# Patient Record
Sex: FEMALE | Race: WHITE | NOT HISPANIC OR LATINO | URBAN - METROPOLITAN AREA
[De-identification: names, ages, dates, MRNs, and addresses within clinical notes are randomized per-mention and may not be internally consistent; named-entity substitution may affect disease eponyms.]

---

## 2017-04-20 ENCOUNTER — OFFICE VISIT (OUTPATIENT)
Dept: INTERNAL MEDICINE | Age: 42
End: 2017-04-20

## 2017-04-20 VITALS
WEIGHT: 156 LBS | OXYGEN SATURATION: 100 % | TEMPERATURE: 98.2 F | DIASTOLIC BLOOD PRESSURE: 62 MMHG | HEIGHT: 64 IN | BODY MASS INDEX: 26.63 KG/M2 | SYSTOLIC BLOOD PRESSURE: 112 MMHG | HEART RATE: 64 BPM

## 2017-04-20 DIAGNOSIS — T63.461A WASP STING, ACCIDENTAL OR UNINTENTIONAL, INITIAL ENCOUNTER: Primary | ICD-10-CM

## 2017-04-20 PROCEDURE — 99212 OFFICE O/P EST SF 10 MIN: CPT | Performed by: INTERNAL MEDICINE

## 2017-04-20 RX ORDER — EPINEPHRINE 0.3 MG/.3ML
INJECTION SUBCUTANEOUS
Qty: 1 EACH | Refills: 3 | Status: SHIPPED | OUTPATIENT
Start: 2017-04-20

## 2017-04-20 NOTE — PROGRESS NOTES
"Beth Monzons / 41 y.o. / female  04/20/2017    VITALS    /62  Pulse 64  Temp 98.2 °F (36.8 °C)  Ht 64\" (162.6 cm)  Wt 156 lb (70.8 kg)  SpO2 100%  BMI 26.78 kg/m2  BP Readings from Last 3 Encounters:   04/20/17 112/62   10/17/16 108/62   08/19/16 114/62     Wt Readings from Last 3 Encounters:   04/20/17 156 lb (70.8 kg)   10/17/16 145 lb (65.8 kg)   08/19/16 139 lb (63 kg)      Body mass index is 26.78 kg/(m^2).    CC:  Main reason(s) for today's visit: Insect Bite (stung by wasp  on sunday)      HPI:     Stung multiple times on left ear lobe by a wasp on Sunday. Taking multiple benadryl and advil daily. No angioedema or urticaria. Last time she was stung was more than 7 years and went to ED for breathing difficulties.     ____________________________________________________________________    ASSESSMENT & PLAN:    1. Wasp sting, accidental or unintentional, initial encounter  Continue benadryl and advil as needed. No steroid indicated at this time but needs epipen for emergencies.  - EPINEPHrine (EPIPEN 2-SHARON) 0.3 MG/0.3ML solution auto-injector injection; Use as instructed if needed for anaphylaxis to bee/wasp sting  Dispense: 1 each; Refill: 3      Summary/Discussion:     ·       No Follow-up on file.    No future appointments.    ____________________________________________________________________    REVIEW OF SYSTEMS    Review of Systems  Other: As noted per HPI  Left ear discomfort, mild redness/swelling  No sob, wheezing  No palpitations, lightheadedness    PHYSICAL EXAMINATION    Physical Exam   Constitutional: No distress.   HENT:   Right Ear: Tympanic membrane and ear canal normal.   Left Ear: Tympanic membrane and ear canal normal.   Ears:          REVIEWED DATA:    Labs:       Imaging:        Medical Tests:        Summary of old records / correspondence / consultant report:        Request outside records:         ALLERGIES:    Review of patient's allergies indicates no known " allergies.    MEDICATIONS:     Current Outpatient Prescriptions   Medication Sig Dispense Refill   • cetirizine (ZyrTEC) 10 MG tablet Take 10 mg by mouth daily.     • ibuprofen (ADVIL,MOTRIN) 200 MG tablet Take 200 mg by mouth every 6 (six) hours as needed for moderate pain (4-6).     • Butalbital-Acetaminophen (BUPAP)  MG tablet Take 1 tablet by mouth every 8 (eight) hours as needed (tension headaches). 40 tablet 0   • EPINEPHrine (EPIPEN 2-SHARON) 0.3 MG/0.3ML solution auto-injector injection Use as instructed if needed for anaphylaxis to bee/wasp sting 1 each 3     No current facility-administered medications for this visit.        [facility]    The following portions of the patient's history were reviewed and updated as appropriate: Allergies / Current Medications / Past Medical History / Surgical History / Social History / Family History    PROBLEM LIST:    Patient Active Problem List   Diagnosis   • Tension headache   • Polyarthralgia   • Acute pharyngitis       PAST MEDICAL HX:    Past Medical History:   Diagnosis Date   • HA (headache)        PAST SURGICAL HX:    Past Surgical History:   Procedure Laterality Date   • TUBAL ABDOMINAL LIGATION         SOCIAL HX:    Social History     Social History   • Marital status: Single     Spouse name: N/A   • Number of children: N/A   • Years of education: N/A     Occupational History   • [employer]            Social History Main Topics   • Smoking status: Former Smoker     Quit date: 3/31/2006   • Smokeless tobacco: None   • Alcohol use No   • Drug use: None   • Sexual activity: Not Asked     Other Topics Concern   • None     Social History Narrative   • None       FAMILY HX:    Family History   Problem Relation Age of Onset   • Diabetes Mother    • Diabetes Father    • Diabetes Sister    • Diabetes Brother    • Prostate cancer Maternal Grandfather    • Lung cancer Maternal Grandfather    • Rheumatologic disease Maternal Grandmother          **Ryan  Disclaimer:   Much of this encounter note is an electronic transcription/translation of spoken language to printed text. The electronic translation of spoken language may permit erroneous, or at times, nonsensical words or phrases to be inadvertently transcribed. Although I have reviewed the note for such errors, some may still exist.

## 2017-04-21 DIAGNOSIS — T63.464A WASP STING, UNDETERMINED INTENT, INITIAL ENCOUNTER: Primary | ICD-10-CM

## 2017-04-21 RX ORDER — METHYLPREDNISOLONE 4 MG/1
TABLET ORAL
Qty: 21 TABLET | Refills: 0 | OUTPATIENT
Start: 2017-04-21 | End: 2020-09-13

## 2019-05-22 ENCOUNTER — CONVERSION ENCOUNTER (OUTPATIENT)
Dept: URGENT CARE | Facility: CLINIC | Age: 44
End: 2019-05-22

## 2019-05-25 ENCOUNTER — CONVERSION ENCOUNTER (OUTPATIENT)
Dept: URGENT CARE | Facility: CLINIC | Age: 44
End: 2019-05-25

## 2019-06-04 VITALS
DIASTOLIC BLOOD PRESSURE: 95 MMHG | OXYGEN SATURATION: 100 % | RESPIRATION RATE: 20 BRPM | WEIGHT: 152.4 LBS | BODY MASS INDEX: 26.16 KG/M2 | SYSTOLIC BLOOD PRESSURE: 141 MMHG | HEART RATE: 87 BPM

## 2019-06-04 VITALS
OXYGEN SATURATION: 100 % | SYSTOLIC BLOOD PRESSURE: 152 MMHG | DIASTOLIC BLOOD PRESSURE: 87 MMHG | HEART RATE: 78 BPM | RESPIRATION RATE: 20 BRPM

## 2019-06-04 NOTE — PROGRESS NOTES
Visit Type:  Acute Visit  Referring Provider:  Felipe  Primary Care Provider:  No PCP    Chief Complaint:  rash on neck.    History of Present Illness:  Patient comes in with a rash at her anterior neck which has become bigger and more painful over the past several days.  She has history of staph and thinks that this is the same thing.  She drained some yellow purulence from it last night.  That time the   redness seems to have spread.  She is worried that it still has drainage within.  She has not had fevers or chills, nausea or vomiting, but she feels just a little ill.  No shortness of breath or swallowing difficulties.      Vital Signs:    Patient Profile:    43 Years Old Female  Height:     64 inches  Weight:     152.4 pounds  BMI:        26.16     O2 Sat:     100 %  Temp:       98.2 degrees F oral  Pulse rate: 87 / minute  Resp:       20 per minute  BP Sittin / 95  (right arm)    Cuff size:  regular      Problems: Active problems were reviewed with the patient during this visit.  Medications: Medications were reviewed with the patient during this visit.  Allergies: Allergies were reviewed with the patient during this visit.  No Known Allergy.        Vitals Entered By: Sandra Sweeney RN RN (May 22, 2019 6:57 PM)    Active Medications (reviewed today):  CLARITIN TABLET (LORATADINE TABS)   NASONEX 50 MCG/ACT NASAL SUSPENSION (MOMETASONE FUROATE)   CELEXA 10 MG ORAL TABLET (CITALOPRAM HYDROBROMIDE)     Current Allergies (reviewed today):  No known allergies      Past Medical History:     Migraines-been years     Allergies     Staph Infections/various areas    Past Surgical History:     Reviewed history from 2012 and no changes required:        Tubal    Family History Summary:      Reviewed history Last on 2012 and no changes required:2019      General Comments - FH:   Diabetes      Social History:     Reviewed history from 2012 and no changes required:        Patient has never  smoked.        Passive Smoke: N        Alcohol Use: N                Children: 3    Social History Summary:  Patient has never smoked.  Passive Smoke: N  Alcohol Use: N    Children: 3  Social History Reviewed: 05/22/2019          Risk Factors:     Smoked Tobacco Use:  Never smoker  Passive smoke exposure:  no  Alcohol use:  no      ROS all negative unless noted.         Physical Exam    General:      well developed, well nourished, in no acute distress.    Head:      normocephalic and atraumatic.    Eyes:      PERRL/EOM intact, conjunctiva and sclera clear with out nystagmus.    Mouth:      no deformity or lesions with good dentition.    Skin:        Anterior neck pink slightly raised erythema with central induration no fluctuance 1 area of pointing.  Discussed the options.  She prefers to try to drain it again.  Let gel applied.Small superficial incision made gentle pressure applied no   purulence.  There is minimal blood loss.  Bandage placed.  Instructions given.      Blood Pressure:  Today's BP: 141/95 mm Hg          Problems:     Problems Added:    1)  Dx of Cellulitis, neck (KUT10-Z53.221) (ICD-682.1)      Impression & Recommendations:    Problem # 1:  Cellulitis, neck (ICD-682.1) (OBM27-D79.221)    Her updated medication list for this problem includes:     Bactrim Ds 800-160 Mg Oral Tablet (Sulfamethoxazole-trimethoprim) ..... Take one (1) tablet by mouth twice a day      Medications Added to Medication List This Visit:  1)  Bactrim Ds 800-160 Mg Oral Tablet (Sulfamethoxazole-trimethoprim) .... Take one (1) tablet by mouth twice a day    Other Orders:  Ofc Vst, New Level III (23351)      Patient Instructions:  1)  Start antibiotic.  Heat to the area.  ER for fever or worsening.  Expect some improvement over 24 hours and then resolution over the next few days.  2)  ANY CONDITION CAN CHANGE, despite proper treatment.    3)  IF YOUR SYMPTOMS WORSEN or PERSIST, get to the nearest ER, call your doctor, or return  to [facility].  4)  FOLLOW-UP CARE IS YOUR RESPONSIBILITY.  Urgent Care is NOT a substitute for your primary care doctor.    5)  ALWAYS FOLLOW-UP WITH YOUR DOCTOR to review this [facility] visit. Blood tests, x-rays, and others often need repeated, or additional tests ordered.    6)  COMPLETE ALL TESTS & REFERRALS ordered.   7)  CONTACT [facility] FOR ALL TEST RESULTS within 72h.      ]          Laceration/ Wound     Objective     cm  Assessment:     Plan:   Rx:   BACTRIM -160 MG ORAL TABLET Take one (1) tablet by mouth twice a day.          Electronically signed by Ty Wang MD on 05/22/2019 at 7:49 PM  ________________________________________________________________________       Disclaimer: Converted Note message may not contain all data elements that existed in the [employer] source system. Please see Communication Intelligence System for the original note details.

## 2019-06-06 NOTE — PROGRESS NOTES
Chief Complaint:  vaginal irritation.    History of Present Illness:  diagnosed with cellulitis on the neck - on 19 - been on bactrim  says has vaginal iritation - used OTC - meds for yeast    was given Rx for diflucan on 19  and it did not help   says vaginal itching and irritation  - getting worse  diabetic not on any meds      Vital Signs:    Patient Profile:    43 Years Old Female  Height:     64 inches  O2 Sat:     100 %  Temp:       99.0 degrees F oral  Pulse rate: 78 / minute  Resp:       20 per minute  BP Sittin / 87  (left arm)    Cuff size:  regular      Problems: Active problems were reviewed with the patient during this visit.  Medications: Medications were reviewed with the patient during this visit.  Allergies: Allergies were reviewed with the patient during this visit.  No Known Allergy.        Vitals Entered By: Sandra Sweeney RN RN (May 25, 2019 12:16 PM)    Active Medications (reviewed today):  DIFLUCAN 150 MG ORAL TABLET (FLUCONAZOLE) 1 PO once  BACTRIM -160 MG ORAL TABLET (SULFAMETHOXAZOLE-TRIMETHOPRIM) Take one (1) tablet by mouth twice a day    Current Allergies (reviewed today):  No known allergies    Current Medications (including medications started today):   DIFLUCAN 150 MG ORAL TABLET (FLUCONAZOLE) 1 PO once  BACTRIM -160 MG ORAL TABLET (SULFAMETHOXAZOLE-TRIMETHOPRIM) Take one (1) tablet by mouth twice a day      Past Medical History:     Reviewed history from 2019 and no changes required:        Migraines-been years        Allergies        Staph Infections/various areas    Past Surgical History:     Reviewed history from 2012 and no changes required:        Tubal    Family History Summary:      Reviewed history Last on 2019 and no changes required:2019      General Comments - FH:  FH Diabetes      Social History:     Reviewed history from 2019 and no changes required:        Passive Smoke: N        Alcohol Use: N        Children:  3                Smoking History:        Patient has never smoked.        Risk Factors:     Smoked Tobacco Use:  Never smoker  Passive smoke exposure:  no  Alcohol use:  no    Previous Tobacco Use: Signed On - 05/22/2019  Smoked Tobacco Use:  Never smoker  Passive smoke exposure:  no    Previous Alcohol Use: Signed On - 05/22/2019  Alcohol use:  no      Review of Systems   General: Denies fevers, chills.   Genitourinary: Complains of vaginal discharge.   Skin: Complains of see HPI.         Physical Exam    General:      well developed, well nourished, in no acute distress.  in some discomfort   Head:      normocephalic and atraumatic.    Mouth:      Moist mucus membranes   Neck:      inf on the neck almost cleared  Lungs:      Normal resp effort   Abdomen:      soft , ND , NT , scaphoid  Genitalia:      normal ext genitalia , thick white discharge ++ , sensitive , erythematous vaginal walls  Skin:      as above  Psych:      alert and cooperative; normal mood and affect; normal attention span and concentration.        Blood Pressure:  Today's BP: 152/87 mm Hg            Impression & Recommendations:    Problem # 1:  CANDIDIASIS, VAGINAL (ICD-112.1) (DKP02-B55.3)  Assessment: New    Her updated medication list for this problem includes:     Diflucan 150 Mg Oral Tablet (Fluconazole) ..... 1 by mouth today     Diflucan 150 Mg Oral Tablet (Fluconazole) ..... 1 po once    Orders:  Ofc Vst, Est Level II (12672)      Medications Added to Medication List This Visit:  1)  Diflucan 150 Mg Oral Tablet (Fluconazole) .... 1 by mouth today      Patient Instructions:  1)  Please complete the antibiotics.  2)  Keep neck clean and dry  3)  Diflucan as prescribed  4)  Make appt at the endocrinology office  5)  Follow up with Primary care Provider.  6)  Follow up care is your responsibility.  7)  Discussed at the end of the visit pertaining findings, results, treatment plan, prescriptions and follow up plan.  8)  Informed pt/caregiver  to call this office with any questions or concerns. Importance of following up with PCP reiterated by nursing staff.  9)  Pt / caregiver agree to the plan and express understanding the instructions and care plan provided in the Urgent Care and the limitations of the Urgent Care       ]          Laceration/ Wound     Objective     cm  Assessment:     Plan:   Rx:   DIFLUCAN 150 MG ORAL TABLET 1 by mouth today.          Electronically signed by Andrew Tomas MD on 05/25/2019 at 12:37 PM  Electronically signed by Ty Wang MD on 05/27/2019 at 9:14 AM  ________________________________________________________________________       Disclaimer: Converted Note message may not contain all data elements that existed in the [employer] source system. Please see Your Office Agent System for the original note details.

## 2020-09-13 PROCEDURE — U0003 INFECTIOUS AGENT DETECTION BY NUCLEIC ACID (DNA OR RNA); SEVERE ACUTE RESPIRATORY SYNDROME CORONAVIRUS 2 (SARS-COV-2) (CORONAVIRUS DISEASE [COVID-19]), AMPLIFIED PROBE TECHNIQUE, MAKING USE OF HIGH THROUGHPUT TECHNOLOGIES AS DESCRIBED BY CMS-2020-01-R: HCPCS | Performed by: NURSE PRACTITIONER

## 2021-01-04 PROCEDURE — 87086 URINE CULTURE/COLONY COUNT: CPT | Performed by: FAMILY MEDICINE

## 2022-03-08 ENCOUNTER — TRANSCRIBE ORDERS (OUTPATIENT)
Dept: CASE MANAGEMENT | Facility: CLINIC | Age: 47
End: 2022-03-08

## 2022-03-08 DIAGNOSIS — Z12.39 SCREENING BREAST EXAMINATION: Primary | ICD-10-CM

## 2022-03-14 ENCOUNTER — APPOINTMENT (OUTPATIENT)
Dept: MAMMOGRAPHY | Facility: HOSPITAL | Age: 47
End: 2022-03-14

## 2022-12-16 ENCOUNTER — LAB (OUTPATIENT)
Dept: LAB | Facility: HOSPITAL | Age: 47
End: 2022-12-16

## 2022-12-16 ENCOUNTER — TRANSCRIBE ORDERS (OUTPATIENT)
Dept: ADMINISTRATIVE | Facility: HOSPITAL | Age: 47
End: 2022-12-16

## 2022-12-16 DIAGNOSIS — Z01.818 OTHER SPECIFIED PRE-OPERATIVE EXAMINATION: Primary | ICD-10-CM

## 2022-12-16 DIAGNOSIS — Z01.818 OTHER SPECIFIED PRE-OPERATIVE EXAMINATION: ICD-10-CM

## 2022-12-16 LAB
BASOPHILS # BLD AUTO: 0.07 10*3/MM3 (ref 0–0.2)
BASOPHILS NFR BLD AUTO: 1 % (ref 0–1.5)
DEPRECATED RDW RBC AUTO: 44.1 FL (ref 37–54)
EOSINOPHIL # BLD AUTO: 0.21 10*3/MM3 (ref 0–0.4)
EOSINOPHIL NFR BLD AUTO: 2.9 % (ref 0.3–6.2)
ERYTHROCYTE [DISTWIDTH] IN BLOOD BY AUTOMATED COUNT: 12 % (ref 12.3–15.4)
HCT VFR BLD AUTO: 36.9 % (ref 34–46.6)
HGB BLD-MCNC: 12.4 G/DL (ref 12–15.9)
IMM GRANULOCYTES # BLD AUTO: 0.01 10*3/MM3 (ref 0–0.05)
IMM GRANULOCYTES NFR BLD AUTO: 0.1 % (ref 0–0.5)
LYMPHOCYTES # BLD AUTO: 2.46 10*3/MM3 (ref 0.7–3.1)
LYMPHOCYTES NFR BLD AUTO: 34.1 % (ref 19.6–45.3)
MCH RBC QN AUTO: 33.6 PG (ref 26.6–33)
MCHC RBC AUTO-ENTMCNC: 33.6 G/DL (ref 31.5–35.7)
MCV RBC AUTO: 100 FL (ref 79–97)
MONOCYTES # BLD AUTO: 0.51 10*3/MM3 (ref 0.1–0.9)
MONOCYTES NFR BLD AUTO: 7.1 % (ref 5–12)
NEUTROPHILS NFR BLD AUTO: 3.95 10*3/MM3 (ref 1.7–7)
NEUTROPHILS NFR BLD AUTO: 54.8 % (ref 42.7–76)
NRBC BLD AUTO-RTO: 0 /100 WBC (ref 0–0.2)
PLATELET # BLD AUTO: 284 10*3/MM3 (ref 140–450)
PMV BLD AUTO: 10.7 FL (ref 6–12)
RBC # BLD AUTO: 3.69 10*6/MM3 (ref 3.77–5.28)
WBC NRBC COR # BLD: 7.21 10*3/MM3 (ref 3.4–10.8)

## 2022-12-16 PROCEDURE — 36415 COLL VENOUS BLD VENIPUNCTURE: CPT

## 2022-12-16 PROCEDURE — 85025 COMPLETE CBC W/AUTO DIFF WBC: CPT

## 2022-12-19 ENCOUNTER — HOSPITAL ENCOUNTER (OUTPATIENT)
Dept: CARDIOLOGY | Facility: HOSPITAL | Age: 47
Discharge: HOME | End: 2022-12-19

## 2022-12-19 ENCOUNTER — LAB (OUTPATIENT)
Dept: LAB | Facility: HOSPITAL | Age: 47
End: 2022-12-19

## 2022-12-19 ENCOUNTER — TRANSCRIBE ORDERS (OUTPATIENT)
Dept: ADMINISTRATIVE | Facility: HOSPITAL | Age: 47
End: 2022-12-19

## 2022-12-19 DIAGNOSIS — Z01.818 PRE-OP EXAMINATION: Primary | ICD-10-CM

## 2022-12-19 DIAGNOSIS — Z01.818 PRE-OP EXAM: ICD-10-CM

## 2022-12-19 DIAGNOSIS — Z01.818 PRE-OP EXAMINATION: ICD-10-CM

## 2022-12-19 LAB
ANION GAP SERPL CALCULATED.3IONS-SCNC: 7 MMOL/L (ref 5–15)
BUN SERPL-MCNC: 16 MG/DL (ref 6–20)
BUN/CREAT SERPL: 20.3 (ref 7–25)
CALCIUM SPEC-SCNC: 9.5 MG/DL (ref 8.6–10.5)
CHLORIDE SERPL-SCNC: 103 MMOL/L (ref 98–107)
CO2 SERPL-SCNC: 29 MMOL/L (ref 22–29)
CREAT SERPL-MCNC: 0.79 MG/DL (ref 0.57–1)
EGFRCR SERPLBLD CKD-EPI 2021: 94.1 ML/MIN/1.73
GLUCOSE SERPL-MCNC: 103 MG/DL (ref 65–99)
POTASSIUM SERPL-SCNC: 3.8 MMOL/L (ref 3.5–5.2)
SODIUM SERPL-SCNC: 139 MMOL/L (ref 136–145)

## 2022-12-19 PROCEDURE — 93005 ELECTROCARDIOGRAM TRACING: CPT | Performed by: ANESTHESIOLOGY

## 2022-12-19 PROCEDURE — 80048 BASIC METABOLIC PNL TOTAL CA: CPT

## 2022-12-19 PROCEDURE — 36415 COLL VENOUS BLD VENIPUNCTURE: CPT

## 2022-12-19 PROCEDURE — 93010 ELECTROCARDIOGRAM REPORT: CPT | Performed by: INTERNAL MEDICINE

## 2022-12-21 LAB — QT INTERVAL: 414 MS

## 2022-12-27 ENCOUNTER — LAB REQUISITION (OUTPATIENT)
Dept: LAB | Facility: HOSPITAL | Age: 47
End: 2022-12-27

## 2022-12-27 DIAGNOSIS — R93.5 ABNORMAL FINDINGS ON DIAGNOSTIC IMAGING OF OTHER ABDOMINAL REGIONS, INCLUDING RETROPERITONEUM: ICD-10-CM

## 2022-12-27 DIAGNOSIS — N92.0 EXCESSIVE AND FREQUENT MENSTRUATION WITH REGULAR CYCLE: ICD-10-CM

## 2022-12-27 PROCEDURE — 88305 TISSUE EXAM BY PATHOLOGIST: CPT | Performed by: OBSTETRICS & GYNECOLOGY

## 2022-12-28 LAB
LAB AP CASE REPORT: NORMAL
PATH REPORT.FINAL DX SPEC: NORMAL
PATH REPORT.GROSS SPEC: NORMAL

## 2023-04-03 PROCEDURE — 87086 URINE CULTURE/COLONY COUNT: CPT | Performed by: PHYSICIAN ASSISTANT

## 2023-12-29 PROCEDURE — 87086 URINE CULTURE/COLONY COUNT: CPT | Performed by: FAMILY MEDICINE

## 2024-05-22 ENCOUNTER — APPOINTMENT (OUTPATIENT)
Dept: GENERAL RADIOLOGY | Facility: HOSPITAL | Age: 49
End: 2024-05-22
Payer: COMMERCIAL

## 2024-05-22 ENCOUNTER — HOSPITAL ENCOUNTER (OUTPATIENT)
Facility: HOSPITAL | Age: 49
Setting detail: OBSERVATION
Discharge: HOME | End: 2024-05-23
Attending: EMERGENCY MEDICINE | Admitting: EMERGENCY MEDICINE
Payer: COMMERCIAL

## 2024-05-22 DIAGNOSIS — R07.9 CHEST PAIN, UNSPECIFIED TYPE: Primary | ICD-10-CM

## 2024-05-22 LAB
ALBUMIN SERPL-MCNC: 4.3 G/DL (ref 3.5–5.2)
ALBUMIN/GLOB SERPL: 1.5 G/DL
ALP SERPL-CCNC: 72 U/L (ref 39–117)
ALT SERPL W P-5'-P-CCNC: 14 U/L (ref 1–33)
ANION GAP SERPL CALCULATED.3IONS-SCNC: 11 MMOL/L (ref 5–15)
AST SERPL-CCNC: 14 U/L (ref 1–32)
BASOPHILS # BLD AUTO: 0.06 10*3/MM3 (ref 0–0.2)
BASOPHILS NFR BLD AUTO: 0.6 % (ref 0–1.5)
BILIRUB SERPL-MCNC: 0.4 MG/DL (ref 0–1.2)
BUN SERPL-MCNC: 13 MG/DL (ref 6–20)
BUN/CREAT SERPL: 19.4 (ref 7–25)
CALCIUM SPEC-SCNC: 10.1 MG/DL (ref 8.6–10.5)
CHLORIDE SERPL-SCNC: 105 MMOL/L (ref 98–107)
CHOLEST SERPL-MCNC: 192 MG/DL (ref 0–200)
CO2 SERPL-SCNC: 22 MMOL/L (ref 22–29)
CREAT SERPL-MCNC: 0.67 MG/DL (ref 0.57–1)
D DIMER PPP FEU-MCNC: 0.27 MG/L (FEU) (ref 0–0.5)
DEPRECATED RDW RBC AUTO: 46.3 FL (ref 37–54)
EGFRCR SERPLBLD CKD-EPI 2021: 109.3 ML/MIN/1.73
EOSINOPHIL # BLD AUTO: 0.23 10*3/MM3 (ref 0–0.4)
EOSINOPHIL NFR BLD AUTO: 2.2 % (ref 0.3–6.2)
ERYTHROCYTE [DISTWIDTH] IN BLOOD BY AUTOMATED COUNT: 12.9 % (ref 12.3–15.4)
GLOBULIN UR ELPH-MCNC: 2.9 GM/DL
GLUCOSE SERPL-MCNC: 117 MG/DL (ref 65–99)
HCT VFR BLD AUTO: 41.7 % (ref 34–46.6)
HDLC SERPL-MCNC: 56 MG/DL (ref 40–60)
HGB BLD-MCNC: 14.2 G/DL (ref 12–15.9)
IMM GRANULOCYTES # BLD AUTO: 0.02 10*3/MM3 (ref 0–0.05)
IMM GRANULOCYTES NFR BLD AUTO: 0.2 % (ref 0–0.5)
LDLC SERPL CALC-MCNC: 122 MG/DL (ref 0–100)
LDLC/HDLC SERPL: 2.15 {RATIO}
LYMPHOCYTES # BLD AUTO: 2.56 10*3/MM3 (ref 0.7–3.1)
LYMPHOCYTES NFR BLD AUTO: 24.8 % (ref 19.6–45.3)
MAGNESIUM SERPL-MCNC: 2.4 MG/DL (ref 1.6–2.6)
MCH RBC QN AUTO: 33.3 PG (ref 26.6–33)
MCHC RBC AUTO-ENTMCNC: 34.1 G/DL (ref 31.5–35.7)
MCV RBC AUTO: 97.7 FL (ref 79–97)
MONOCYTES # BLD AUTO: 0.63 10*3/MM3 (ref 0.1–0.9)
MONOCYTES NFR BLD AUTO: 6.1 % (ref 5–12)
NEUTROPHILS NFR BLD AUTO: 6.83 10*3/MM3 (ref 1.7–7)
NEUTROPHILS NFR BLD AUTO: 66.1 % (ref 42.7–76)
NRBC BLD AUTO-RTO: 0 /100 WBC (ref 0–0.2)
PLATELET # BLD AUTO: 262 10*3/MM3 (ref 140–450)
PMV BLD AUTO: 10.5 FL (ref 6–12)
POTASSIUM SERPL-SCNC: 4 MMOL/L (ref 3.5–5.2)
PROT SERPL-MCNC: 7.2 G/DL (ref 6–8.5)
RBC # BLD AUTO: 4.27 10*6/MM3 (ref 3.77–5.28)
SODIUM SERPL-SCNC: 138 MMOL/L (ref 136–145)
T4 FREE SERPL-MCNC: 1.46 NG/DL (ref 0.93–1.7)
TRIGL SERPL-MCNC: 79 MG/DL (ref 0–150)
TROPONIN T SERPL HS-MCNC: <6 NG/L
TSH SERPL DL<=0.05 MIU/L-ACNC: 1 UIU/ML (ref 0.27–4.2)
VLDLC SERPL-MCNC: 14 MG/DL (ref 5–40)
WBC NRBC COR # BLD AUTO: 10.33 10*3/MM3 (ref 3.4–10.8)

## 2024-05-22 PROCEDURE — G0378 HOSPITAL OBSERVATION PER HR: HCPCS

## 2024-05-22 PROCEDURE — 25010000002 ENOXAPARIN PER 10 MG: Performed by: EMERGENCY MEDICINE

## 2024-05-22 PROCEDURE — 85025 COMPLETE CBC W/AUTO DIFF WBC: CPT | Performed by: EMERGENCY MEDICINE

## 2024-05-22 PROCEDURE — 93005 ELECTROCARDIOGRAM TRACING: CPT

## 2024-05-22 PROCEDURE — 84439 ASSAY OF FREE THYROXINE: CPT | Performed by: EMERGENCY MEDICINE

## 2024-05-22 PROCEDURE — 96372 THER/PROPH/DIAG INJ SC/IM: CPT

## 2024-05-22 PROCEDURE — 83735 ASSAY OF MAGNESIUM: CPT | Performed by: EMERGENCY MEDICINE

## 2024-05-22 PROCEDURE — 71045 X-RAY EXAM CHEST 1 VIEW: CPT

## 2024-05-22 PROCEDURE — 80053 COMPREHEN METABOLIC PANEL: CPT | Performed by: EMERGENCY MEDICINE

## 2024-05-22 PROCEDURE — 84443 ASSAY THYROID STIM HORMONE: CPT | Performed by: EMERGENCY MEDICINE

## 2024-05-22 PROCEDURE — 93005 ELECTROCARDIOGRAM TRACING: CPT | Performed by: EMERGENCY MEDICINE

## 2024-05-22 PROCEDURE — 84484 ASSAY OF TROPONIN QUANT: CPT | Performed by: EMERGENCY MEDICINE

## 2024-05-22 PROCEDURE — 99285 EMERGENCY DEPT VISIT HI MDM: CPT

## 2024-05-22 PROCEDURE — 80061 LIPID PANEL: CPT | Performed by: PHYSICIAN ASSISTANT

## 2024-05-22 PROCEDURE — 85379 FIBRIN DEGRADATION QUANT: CPT | Performed by: EMERGENCY MEDICINE

## 2024-05-22 RX ORDER — NITROGLYCERIN 0.4 MG/1
0.4 TABLET SUBLINGUAL
Status: DISCONTINUED | OUTPATIENT
Start: 2024-05-22 | End: 2024-05-23 | Stop reason: HOSPADM

## 2024-05-22 RX ORDER — POLYETHYLENE GLYCOL 3350 17 G/17G
17 POWDER, FOR SOLUTION ORAL DAILY PRN
Status: DISCONTINUED | OUTPATIENT
Start: 2024-05-22 | End: 2024-05-23 | Stop reason: HOSPADM

## 2024-05-22 RX ORDER — CETIRIZINE HYDROCHLORIDE 10 MG/1
10 TABLET ORAL DAILY
Status: DISCONTINUED | OUTPATIENT
Start: 2024-05-22 | End: 2024-05-23 | Stop reason: HOSPADM

## 2024-05-22 RX ORDER — SODIUM CHLORIDE 0.9 % (FLUSH) 0.9 %
10 SYRINGE (ML) INJECTION EVERY 12 HOURS SCHEDULED
Status: DISCONTINUED | OUTPATIENT
Start: 2024-05-22 | End: 2024-05-23 | Stop reason: HOSPADM

## 2024-05-22 RX ORDER — ONDANSETRON 2 MG/ML
4 INJECTION INTRAMUSCULAR; INTRAVENOUS EVERY 6 HOURS PRN
Status: DISCONTINUED | OUTPATIENT
Start: 2024-05-22 | End: 2024-05-23 | Stop reason: HOSPADM

## 2024-05-22 RX ORDER — ONDANSETRON 4 MG/1
4 TABLET, ORALLY DISINTEGRATING ORAL EVERY 6 HOURS PRN
Status: DISCONTINUED | OUTPATIENT
Start: 2024-05-22 | End: 2024-05-23 | Stop reason: HOSPADM

## 2024-05-22 RX ORDER — ALUMINA, MAGNESIA, AND SIMETHICONE 2400; 2400; 240 MG/30ML; MG/30ML; MG/30ML
15 SUSPENSION ORAL EVERY 6 HOURS PRN
Status: DISCONTINUED | OUTPATIENT
Start: 2024-05-22 | End: 2024-05-23 | Stop reason: HOSPADM

## 2024-05-22 RX ORDER — UREA 10 %
5 LOTION (ML) TOPICAL NIGHTLY PRN
Status: DISCONTINUED | OUTPATIENT
Start: 2024-05-22 | End: 2024-05-23 | Stop reason: HOSPADM

## 2024-05-22 RX ORDER — ACETAMINOPHEN 325 MG/1
650 TABLET ORAL EVERY 4 HOURS PRN
Status: DISCONTINUED | OUTPATIENT
Start: 2024-05-22 | End: 2024-05-23 | Stop reason: HOSPADM

## 2024-05-22 RX ORDER — ACETAMINOPHEN 160 MG/5ML
650 SOLUTION ORAL EVERY 4 HOURS PRN
Status: DISCONTINUED | OUTPATIENT
Start: 2024-05-22 | End: 2024-05-23 | Stop reason: HOSPADM

## 2024-05-22 RX ORDER — SODIUM CHLORIDE 9 MG/ML
40 INJECTION, SOLUTION INTRAVENOUS AS NEEDED
Status: DISCONTINUED | OUTPATIENT
Start: 2024-05-22 | End: 2024-05-23 | Stop reason: HOSPADM

## 2024-05-22 RX ORDER — BISACODYL 10 MG
10 SUPPOSITORY, RECTAL RECTAL DAILY PRN
Status: DISCONTINUED | OUTPATIENT
Start: 2024-05-22 | End: 2024-05-23 | Stop reason: HOSPADM

## 2024-05-22 RX ORDER — SODIUM CHLORIDE 0.9 % (FLUSH) 0.9 %
10 SYRINGE (ML) INJECTION AS NEEDED
Status: DISCONTINUED | OUTPATIENT
Start: 2024-05-22 | End: 2024-05-23 | Stop reason: HOSPADM

## 2024-05-22 RX ORDER — BISACODYL 5 MG/1
5 TABLET, DELAYED RELEASE ORAL DAILY PRN
Status: DISCONTINUED | OUTPATIENT
Start: 2024-05-22 | End: 2024-05-23 | Stop reason: HOSPADM

## 2024-05-22 RX ORDER — ACETAMINOPHEN 650 MG/1
650 SUPPOSITORY RECTAL EVERY 4 HOURS PRN
Status: DISCONTINUED | OUTPATIENT
Start: 2024-05-22 | End: 2024-05-23 | Stop reason: HOSPADM

## 2024-05-22 RX ORDER — AMOXICILLIN 250 MG
2 CAPSULE ORAL 2 TIMES DAILY PRN
Status: DISCONTINUED | OUTPATIENT
Start: 2024-05-22 | End: 2024-05-23 | Stop reason: HOSPADM

## 2024-05-22 RX ORDER — ASPIRIN 325 MG
325 TABLET ORAL ONCE
Status: COMPLETED | OUTPATIENT
Start: 2024-05-22 | End: 2024-05-22

## 2024-05-22 RX ORDER — ENOXAPARIN SODIUM 100 MG/ML
40 INJECTION SUBCUTANEOUS EVERY 24 HOURS
Status: DISCONTINUED | OUTPATIENT
Start: 2024-05-22 | End: 2024-05-23 | Stop reason: HOSPADM

## 2024-05-22 RX ORDER — ASPIRIN 81 MG/1
81 TABLET, CHEWABLE ORAL DAILY
Status: DISCONTINUED | OUTPATIENT
Start: 2024-05-22 | End: 2024-05-23 | Stop reason: HOSPADM

## 2024-05-22 RX ADMIN — CETIRIZINE HYDROCHLORIDE 10 MG: 10 TABLET, FILM COATED ORAL at 17:47

## 2024-05-22 RX ADMIN — ENOXAPARIN SODIUM 40 MG: 100 INJECTION SUBCUTANEOUS at 17:47

## 2024-05-22 RX ADMIN — ASPIRIN 325 MG ORAL TABLET 325 MG: 325 PILL ORAL at 12:56

## 2024-05-22 RX ADMIN — Medication 10 ML: at 17:20

## 2024-05-22 NOTE — PLAN OF CARE
Goal Outcome Evaluation:   AAOx4, RA, Tele monitored. NPO at midnight for stress test and echo 5/23/24.

## 2024-05-22 NOTE — ED PROVIDER NOTES
Subjective   History of Present Illness  Chief complaint pressure in chest    History of present illness 48-year-old female who presents complaints of pressure in her chest shortness of breath and left arm discomfort that started last night she was able to ultimately go to sleep and it lasted for several hours.  Today she was at work and she started to have the same symptoms and she presents for evaluation.  Nothing really seems to trigger it seems worse with exertion and better with rest no dizziness or syncope no recent illness fevers flus viruses vaccinations foreign travels antibiotic use leg pain or swelling.  She has no history of heart disease and normally does anything she wants without chest pain or shortness of breath but there is some family history.  No family history of clotting disorders or sudden death.      Review of Systems   Constitutional:  Negative for chills and fever.   Respiratory:  Positive for chest tightness and shortness of breath.    Cardiovascular:  Positive for chest pain. Negative for palpitations.   Gastrointestinal:  Positive for vomiting. Negative for abdominal pain.   Musculoskeletal:  Negative for neck pain.   Skin:  Negative for rash.   Neurological:  Negative for dizziness and light-headedness.       Past Medical History:   Diagnosis Date    Diabetes mellitus     HA (headache)     Hypertension        Allergies   Allergen Reactions    Adhesive Tape Rash    Penicillins Rash       Past Surgical History:   Procedure Laterality Date    HYSTERECTOMY      TUBAL ABDOMINAL LIGATION         Family History   Problem Relation Age of Onset    Diabetes Mother     Diabetes Father     Diabetes Sister     Diabetes Brother     Prostate cancer Maternal Grandfather     Lung cancer Maternal Grandfather     Rheumatologic disease Maternal Grandmother        Social History     Socioeconomic History    Marital status:    Tobacco Use    Smoking status: Former     Current packs/day: 0.00     Types:  Cigarettes     Quit date: 3/31/2006     Years since quittin.1     Passive exposure: Never    Smokeless tobacco: Never   Vaping Use    Vaping status: Never Used   Substance and Sexual Activity    Alcohol use: Yes     Comment: rarely    Drug use: Never    Sexual activity: Yes     Partners: Male     Prior to Admission medications    Medication Sig Start Date End Date Taking? Authorizing Provider   cetirizine (zyrTEC) 10 MG tablet Take 1 tablet by mouth Daily.   Yes ProviderYunier MD   ibuprofen (ADVIL,MOTRIN) 200 MG tablet Take 200 mg by mouth every 6 (six) hours as needed for moderate pain (4-6).    Provider, MD Yunier   EPINEPHrine (EPIPEN 2-SHARON) 0.3 MG/0.3ML solution auto-injector injection Use as instructed if needed for anaphylaxis to bee/wasp sting 17  Jacques Dallas MD          Objective   Physical Exam  Constitutional is a 48-year-old female awake alert no acute distress triage vital signs reviewed HEENT extraocular muscles are intact pupils equal round reactive sclera clear neck supple no adenopathy no JVD no bruits lungs clear no retraction heart regular without murmur rub abdomen soft nontender good bowel sounds no peritoneal findings or pulsatile masses extremities pulses equal upper and lower extremities no edema no cords no Homans' sign no evidence of DVT skin warm and dry without rashes or cellulitic changes neurologic awake alert follows commands without focal weakness  Procedures           ED Course      Results for orders placed or performed during the hospital encounter of 24   Comprehensive Metabolic Panel    Specimen: Blood   Result Value Ref Range    Glucose 117 (H) 65 - 99 mg/dL    BUN 13 6 - 20 mg/dL    Creatinine 0.67 0.57 - 1.00 mg/dL    Sodium 138 136 - 145 mmol/L    Potassium 4.0 3.5 - 5.2 mmol/L    Chloride 105 98 - 107 mmol/L    CO2 22.0 22.0 - 29.0 mmol/L    Calcium 10.1 8.6 - 10.5 mg/dL    Total Protein 7.2 6.0 - 8.5 g/dL    Albumin 4.3 3.5 - 5.2 g/dL     ALT (SGPT) 14 1 - 33 U/L    AST (SGOT) 14 1 - 32 U/L    Alkaline Phosphatase 72 39 - 117 U/L    Total Bilirubin 0.4 0.0 - 1.2 mg/dL    Globulin 2.9 gm/dL    A/G Ratio 1.5 g/dL    BUN/Creatinine Ratio 19.4 7.0 - 25.0    Anion Gap 11.0 5.0 - 15.0 mmol/L    eGFR 109.3 >60.0 mL/min/1.73   Single High Sensitivity Troponin T    Specimen: Blood   Result Value Ref Range    HS Troponin T <6 <14 ng/L   D-dimer, Quantitative    Specimen: Blood   Result Value Ref Range    D-Dimer, Quantitative 0.27 0.00 - 0.50 mg/L (FEU)   T4, Free    Specimen: Blood   Result Value Ref Range    Free T4 1.46 0.93 - 1.70 ng/dL   TSH    Specimen: Blood   Result Value Ref Range    TSH 0.999 0.270 - 4.200 uIU/mL   Magnesium    Specimen: Blood   Result Value Ref Range    Magnesium 2.4 1.6 - 2.6 mg/dL   CBC Auto Differential    Specimen: Blood   Result Value Ref Range    WBC 10.33 3.40 - 10.80 10*3/mm3    RBC 4.27 3.77 - 5.28 10*6/mm3    Hemoglobin 14.2 12.0 - 15.9 g/dL    Hematocrit 41.7 34.0 - 46.6 %    MCV 97.7 (H) 79.0 - 97.0 fL    MCH 33.3 (H) 26.6 - 33.0 pg    MCHC 34.1 31.5 - 35.7 g/dL    RDW 12.9 12.3 - 15.4 %    RDW-SD 46.3 37.0 - 54.0 fl    MPV 10.5 6.0 - 12.0 fL    Platelets 262 140 - 450 10*3/mm3    Neutrophil % 66.1 42.7 - 76.0 %    Lymphocyte % 24.8 19.6 - 45.3 %    Monocyte % 6.1 5.0 - 12.0 %    Eosinophil % 2.2 0.3 - 6.2 %    Basophil % 0.6 0.0 - 1.5 %    Immature Grans % 0.2 0.0 - 0.5 %    Neutrophils, Absolute 6.83 1.70 - 7.00 10*3/mm3    Lymphocytes, Absolute 2.56 0.70 - 3.10 10*3/mm3    Monocytes, Absolute 0.63 0.10 - 0.90 10*3/mm3    Eosinophils, Absolute 0.23 0.00 - 0.40 10*3/mm3    Basophils, Absolute 0.06 0.00 - 0.20 10*3/mm3    Immature Grans, Absolute 0.02 0.00 - 0.05 10*3/mm3    nRBC 0.0 0.0 - 0.2 /100 WBC   ECG 12 Lead Chest Pain   Result Value Ref Range    QT Interval 346 ms    QTC Interval 441 ms     XR Chest 1 View    Result Date: 5/22/2024  Impression: No acute chest finding. Electronically Signed: Mica You MD   5/22/2024 10:47 AM EDT  Workstation ID: [id number]   Medications   sodium chloride 0.9 % flush 10 mL (has no administration in time range)   aspirin tablet 325 mg (has no administration in time range)                 HEART Score: 3                  EKG my interpretation normal sinus rhythm rate 97 normal axis no hypertrophy QTc of 441 normal EKG unchanged in 12/19/2022            Medical Decision Making  Medical decision making IV established monitor placement review of sinus rhythm.  EKG obtained my interpretation normal sinus rhythm rate 97 normal axis no hypertrophy was a normal EKG and unchanged from 12/19/2022.  Chest x-ray my independent review no pneumonia pneumothorax or failure radiology review unremarkable.  Labs obtained Magnapen review comprehensive metabolic profile negative troponin negative D-dimer within normal limit TSH T4 normal magnesium normal CBC unremarkable patient given aspirin p.o.  Patient resting comfortably on repeat exam I do not see any evidence of acute myocardial infarction DVT pulmonary embolism aortic dissection pericarditis myocarditis pericardial effusion although this is not a complete list of all possibilities.  Patient has some risk factors for heart disease.  She is having exertional chest discomfort.  She be placed in observation for serial troponin stress testing and further cardiac workup patient agreeable stable unremarkable ER course    Problems Addressed:  Chest pain, unspecified type: complicated acute illness or injury    Amount and/or Complexity of Data Reviewed  External Data Reviewed: ECG.  Labs: ordered. Decision-making details documented in ED Course.  Radiology: ordered and independent interpretation performed. Decision-making details documented in ED Course.  ECG/medicine tests: ordered and independent interpretation performed. Decision-making details documented in ED Course.    Risk  OTC drugs.  Prescription drug management.  Decision regarding  hospitalization.        Final diagnoses:   Chest pain, unspecified type       ED Disposition  ED Disposition       ED Disposition   Decision to Admit    Condition   --    Comment   --               No follow-up provider specified.       Medication List      No changes were made to your prescriptions during this visit.            Shashi Rangel MD  05/22/24 [id number]

## 2024-05-22 NOTE — H&P
[facility] Observation Unit H&P    Patient Name: Beth Menchaca  : 1975  MRN: [id number]  Primary Care Physician: Charity Moise MD  Date of admission: 2024     Patient Care Team:  Charity Moise MD as PCP - General (Family Medicine)          Subjective   History Present Illness     Chief Complaint:   Chief Complaint   Patient presents with    Chest Pain     Chest pain, left side of chest started last night, soa.  Pt sent in by Dr. Carr for evaluation.       Chest pain    History of Present Illness  24:  Patient reports having severe chest discomfort described as a heaviness on the left side of her chest on the previous evening while laying down for bed.  She took 481 mg aspirin at that time.  Some dyspnea as well as palpitations were present though she denies any recent significant cough (mild cough associated with allergies as noted), fever or changes in bowel or bladder habits.  Additionally she notes that for some time now she has had some lightheadedness/seeing spots whenever she changes position quickly but has not had any syncopal episodes.  No peripheral edema is noted.  Family history is significant for father who passed away of an MI and is 50s as well as brother and sister who both have CAD.        Review of Systems   Constitutional: Negative.   HENT: Negative.     Eyes: Negative.    Cardiovascular:  Positive for chest pain, near-syncope and palpitations.   Respiratory:  Positive for cough and shortness of breath.    Skin: Negative.    Musculoskeletal: Negative.    Gastrointestinal: Negative.    Genitourinary: Negative.    Neurological: Negative.    Psychiatric/Behavioral: Negative.           Personal History     Past Medical History:   Past Medical History:   Diagnosis Date    Diabetes mellitus     HA (headache)     Hypertension        Surgical History:      Past Surgical History:   Procedure Laterality Date    HYSTERECTOMY      TUBAL ABDOMINAL LIGATION             Family  History: family history includes Diabetes in her brother, father, mother, and sister; Lung cancer in her maternal grandfather; Prostate cancer in her maternal grandfather; Rheumatologic disease in her maternal grandmother. Otherwise pertinent FHx was reviewed and unremarkable.     Social History:  reports that she quit smoking about 18 years ago. Her smoking use included cigarettes. She has never been exposed to tobacco smoke. She has never used smokeless tobacco. She reports current alcohol use. She reports that she does not use drugs.      Medications:  Prior to Admission medications    Medication Sig Start Date End Date Taking? Authorizing Provider   cetirizine (zyrTEC) 10 MG tablet Take 1 tablet by mouth Daily.    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    Provider, MD Yunier       Allergies:    Allergies   Allergen Reactions    Adhesive Tape Rash    Penicillins Rash       Objective   Objective     Vital Signs  Temp:  [98.9 °F (37.2 °C)] 98.9 °F (37.2 °C)  Heart Rate:  [78-84] 78  Resp:  [16-18] 18  BP: (122-144)/(81-87) 122/81  SpO2:  [99 %] 99 %  on   ;   Device (Oxygen Therapy): room air  Body mass index is 29.87 kg/m².    Physical Exam  Vitals reviewed.   Constitutional:       General: She is not in acute distress.     Appearance: Normal appearance. She is normal weight. She is not ill-appearing, toxic-appearing or diaphoretic.   HENT:      Head: Normocephalic.      Right Ear: External ear normal.      Left Ear: External ear normal.      Nose: Nose normal.      Mouth/Throat:      Mouth: Mucous membranes are moist.   Eyes:      Extraocular Movements: Extraocular movements intact.   Cardiovascular:      Rate and Rhythm: Normal rate and regular  rhythm.      Pulses: Normal pulses.   Pulmonary:      Effort: Pulmonary effort is normal.      Breath sounds: Normal breath sounds.   Abdominal:      General: Bowel sounds are normal.      Palpations: Abdomen is soft.   Musculoskeletal:         General: Normal range of motion.      Cervical back: Normal range of motion.      Right lower leg: No edema.      Left lower leg: No edema.   Skin:     General: Skin is warm and dry.      Capillary Refill: Capillary refill takes less than 2 seconds.   Neurological:      General: No focal deficit present.      Mental Status: She is alert.   Psychiatric:         Mood and Affect: Mood normal.         Behavior: Behavior normal.         Thought Content: Thought content normal.         Judgment: Judgment normal.       Results Review:  I have personally reviewed most recent cardiac tracings, lab results, and radiology images and interpretations and agree with findings, most notably: Troponin, TSH, free T4, D-dimer, CBC, CMP, chest x-ray and EKG.    Results from last 7 days   Lab Units 05/22/24  1107   WBC 10*3/mm3 10.33   HEMOGLOBIN g/dL 14.2   HEMATOCRIT % 41.7   PLATELETS 10*3/mm3 262     Results from last 7 days   Lab Units 05/22/24  1107   SODIUM mmol/L 138   POTASSIUM mmol/L 4.0   CHLORIDE mmol/L 105   CO2 mmol/L 22.0   BUN mg/dL 13   CREATININE mg/dL 0.67   GLUCOSE mg/dL 117*   CALCIUM mg/dL 10.1   ALK PHOS U/L 72   ALT (SGPT) U/L 14   AST (SGOT) U/L 14   HSTROP T ng/L <6     Estimated Creatinine Clearance: 106.7 mL/min (by C-G formula based on SCr of 0.67 mg/dL).  Brief Urine Lab Results  (Last result in the past 365 days)        Color   Clarity   Blood   Leuk Est   Nitrite   Protein   CREAT   Urine HCG        12/29/23 0834 Yellow   Clear   Negative   Negative   Negative   Negative                   Microbiology Results (last 10 days)       ** No results found for the last 240 hours. **            ECG/EMG Results (most recent)       Procedure Component Value Units Date/Time     ECG 12 Lead Chest Pain [[id number]] Collected: 05/22/24 1005     Updated: 05/22/24 1006     QT Interval 346 ms      QTC Interval 441 ms     Narrative:      HEART RATE= 97  bpm  RR Interval= 616  ms  SD Interval= 140  ms  P Horizontal Axis= 15  deg  P Front Axis= 40  deg  QRSD Interval= 82  ms  QT Interval= 346  ms  QTcB= 441  ms  QRS Axis= 72  deg  T Wave Axis= 10  deg  - OTHERWISE NORMAL ECG -  Sinus rhythm  Low voltage, precordial leads  When compared with ECG of 19-Dec-2022 15:51:47,  Significant rate increase  Electronically Signed By:   Date and Time of Study: 2024-05-22 10:05:19                    XR Chest 1 View    Result Date: 5/22/2024  Impression: No acute chest finding. Electronically Signed: Mica You MD  5/22/2024 10:47 AM EDT  Workstation ID: [id number]       Estimated Creatinine Clearance: 106.7 mL/min (by C-G formula based on SCr of 0.67 mg/dL).    Assessment & Plan   Assessment/Plan       Active Hospital Problems    Diagnosis  POA    **Chest pain [R07.9]  Yes      Resolved Hospital Problems   No resolved problems to display.       Chest pain  Lab Results   Component Value Date    TROPONINT <6 05/22/2024   -D-dimer: 0.27  -TSH: 0.999, free T4: 1.46  -Trend troponin  -Lipid panel ordered  -Chest X-ray: No acute finding  -EKG: Sinus rhythm at 97 with T wave inversion in lead III but without obvious acute changes or ectopy with a QTc of 441 ms  -In the ED pt given 325 mg aspirin  -Stress Test ordered  -Echocardiogram ordered  -Telemetry  -NPO at midnight            VTE Prophylaxis -   Mechanical Order History:       None          Pharmalogical Order History:       None            CODE STATUS:    There are no questions and answers to display.       This patient has been examined wearing personal protective equipment.     I discussed the patient's findings and my recommendations with patient and nursing staff.      Signature:Electronically signed by Kraig Kamara PA-C, 05/22/24, 5:16 PM  EDT.

## 2024-05-23 ENCOUNTER — APPOINTMENT (OUTPATIENT)
Dept: NUCLEAR MEDICINE | Facility: HOSPITAL | Age: 49
End: 2024-05-23
Payer: COMMERCIAL

## 2024-05-23 ENCOUNTER — APPOINTMENT (OUTPATIENT)
Dept: CARDIOLOGY | Facility: HOSPITAL | Age: 49
End: 2024-05-23
Payer: COMMERCIAL

## 2024-05-23 VITALS
HEART RATE: 75 BPM | WEIGHT: 175 LBS | SYSTOLIC BLOOD PRESSURE: 122 MMHG | RESPIRATION RATE: 15 BRPM | HEIGHT: 64 IN | TEMPERATURE: 99.1 F | DIASTOLIC BLOOD PRESSURE: 71 MMHG | OXYGEN SATURATION: 98 % | BODY MASS INDEX: 29.88 KG/M2

## 2024-05-23 LAB
ANION GAP SERPL CALCULATED.3IONS-SCNC: 11 MMOL/L (ref 5–15)
BASOPHILS # BLD AUTO: 0.06 10*3/MM3 (ref 0–0.2)
BASOPHILS NFR BLD AUTO: 0.7 % (ref 0–1.5)
BH CV ECHO LEFT VENTRICLE GLOBAL LONGITUDINAL STRAIN: -17.2 %
BH CV ECHO MEAS - ACS: 1.9 CM
BH CV ECHO MEAS - AO MAX PG: 5.8 MMHG
BH CV ECHO MEAS - AO MEAN PG: 3 MMHG
BH CV ECHO MEAS - AO V2 MAX: 120 CM/SEC
BH CV ECHO MEAS - AO V2 VTI: 24.7 CM
BH CV ECHO MEAS - AVA(I,D): 1.6 CM2
BH CV ECHO MEAS - EDV(CUBED): 85.2 ML
BH CV ECHO MEAS - EDV(MOD-SP4): 65.8 ML
BH CV ECHO MEAS - EF(MOD-SP4): 70.4 %
BH CV ECHO MEAS - EF_3D-VOL: 64 %
BH CV ECHO MEAS - ESV(CUBED): 32.8 ML
BH CV ECHO MEAS - ESV(MOD-SP4): 19.5 ML
BH CV ECHO MEAS - FS: 27.3 %
BH CV ECHO MEAS - IVS/LVPW: 1 CM
BH CV ECHO MEAS - IVSD: 1 CM
BH CV ECHO MEAS - LA DIMENSION: 3.2 CM
BH CV ECHO MEAS - LAT PEAK E' VEL: 16 CM/SEC
BH CV ECHO MEAS - LV MASS(C)D: 147.8 GRAMS
BH CV ECHO MEAS - LV MAX PG: 5 MMHG
BH CV ECHO MEAS - LV MEAN PG: 2 MMHG
BH CV ECHO MEAS - LV V1 MAX: 112 CM/SEC
BH CV ECHO MEAS - LV V1 VTI: 19.7 CM
BH CV ECHO MEAS - LVIDD: 4.4 CM
BH CV ECHO MEAS - LVIDS: 3.2 CM
BH CV ECHO MEAS - LVOT AREA: 2.01 CM2
BH CV ECHO MEAS - LVOT DIAM: 1.6 CM
BH CV ECHO MEAS - LVPWD: 1 CM
BH CV ECHO MEAS - MED PEAK E' VEL: 12.6 CM/SEC
BH CV ECHO MEAS - MV A MAX VEL: 81.2 CM/SEC
BH CV ECHO MEAS - MV DEC SLOPE: 388.5 CM/SEC2
BH CV ECHO MEAS - MV DEC TIME: 0.21 SEC
BH CV ECHO MEAS - MV E MAX VEL: 88 CM/SEC
BH CV ECHO MEAS - MV E/A: 1.08
BH CV ECHO MEAS - MV MAX PG: 3.7 MMHG
BH CV ECHO MEAS - MV MEAN PG: 2 MMHG
BH CV ECHO MEAS - MV P1/2T: 78.4 MSEC
BH CV ECHO MEAS - MV V2 VTI: 24 CM
BH CV ECHO MEAS - MVA(P1/2T): 2.8 CM2
BH CV ECHO MEAS - MVA(VTI): 1.65 CM2
BH CV ECHO MEAS - PA V2 MAX: 112 CM/SEC
BH CV ECHO MEAS - PULM A REVS DUR: 0.11 SEC
BH CV ECHO MEAS - PULM A REVS VEL: 35.7 CM/SEC
BH CV ECHO MEAS - PULM DIAS VEL: 46.4 CM/SEC
BH CV ECHO MEAS - PULM S/D: 1.12
BH CV ECHO MEAS - PULM SYS VEL: 52.1 CM/SEC
BH CV ECHO MEAS - RV MAX PG: 2.7 MMHG
BH CV ECHO MEAS - RV V1 MAX: 81.4 CM/SEC
BH CV ECHO MEAS - RV V1 VTI: 17.5 CM
BH CV ECHO MEAS - RVDD: 2.3 CM
BH CV ECHO MEAS - SV(LVOT): 39.6 ML
BH CV ECHO MEAS - SV(MOD-SP4): 46.3 ML
BH CV ECHO MEAS - TAPSE (>1.6): 2.7 CM
BH CV ECHO MEASUREMENTS AVERAGE E/E' RATIO: 6.15
BH CV NUCLEAR PRIOR STUDY: 2
BH CV REST NUCLEAR ISOTOPE DOSE: 10.3 MCI
BH CV STRESS BP STAGE 1: NORMAL
BH CV STRESS BP STAGE 2: NORMAL
BH CV STRESS BP STAGE 3: NORMAL
BH CV STRESS BP STAGE 4: NORMAL
BH CV STRESS DURATION MIN STAGE 1: 3
BH CV STRESS DURATION MIN STAGE 2: 3
BH CV STRESS DURATION MIN STAGE 3: 3
BH CV STRESS DURATION MIN STAGE 4: 1
BH CV STRESS DURATION SEC STAGE 1: 0
BH CV STRESS DURATION SEC STAGE 2: 0
BH CV STRESS DURATION SEC STAGE 3: 0
BH CV STRESS DURATION SEC STAGE 4: 10
BH CV STRESS GRADE STAGE 1: 10
BH CV STRESS GRADE STAGE 2: 12
BH CV STRESS GRADE STAGE 3: 14
BH CV STRESS GRADE STAGE 4: 16
BH CV STRESS HR STAGE 1: 114
BH CV STRESS HR STAGE 2: 129
BH CV STRESS HR STAGE 3: 140
BH CV STRESS HR STAGE 4: 154
BH CV STRESS METS STAGE 1: 5
BH CV STRESS METS STAGE 2: 7.5
BH CV STRESS METS STAGE 3: 10
BH CV STRESS METS STAGE 4: 13.5
BH CV STRESS NUCLEAR ISOTOPE DOSE: 31.3 MCI
BH CV STRESS PROTOCOL 1: NORMAL
BH CV STRESS RECOVERY BP: NORMAL MMHG
BH CV STRESS RECOVERY HR: 90 BPM
BH CV STRESS SPEED STAGE 1: 1.7
BH CV STRESS SPEED STAGE 2: 2.5
BH CV STRESS SPEED STAGE 3: 3.4
BH CV STRESS SPEED STAGE 4: 4.2
BH CV STRESS STAGE 1: 1
BH CV STRESS STAGE 2: 2
BH CV STRESS STAGE 3: 3
BH CV STRESS STAGE 4: 4
BUN SERPL-MCNC: 17 MG/DL (ref 6–20)
BUN/CREAT SERPL: 27.4 (ref 7–25)
CALCIUM SPEC-SCNC: 9.5 MG/DL (ref 8.6–10.5)
CHLORIDE SERPL-SCNC: 109 MMOL/L (ref 98–107)
CO2 SERPL-SCNC: 20 MMOL/L (ref 22–29)
CREAT SERPL-MCNC: 0.62 MG/DL (ref 0.57–1)
DEPRECATED RDW RBC AUTO: 46.6 FL (ref 37–54)
EGFRCR SERPLBLD CKD-EPI 2021: 111.4 ML/MIN/1.73
EOSINOPHIL # BLD AUTO: 0.41 10*3/MM3 (ref 0–0.4)
EOSINOPHIL NFR BLD AUTO: 4.6 % (ref 0.3–6.2)
ERYTHROCYTE [DISTWIDTH] IN BLOOD BY AUTOMATED COUNT: 12.9 % (ref 12.3–15.4)
GLUCOSE SERPL-MCNC: 102 MG/DL (ref 65–99)
HCT VFR BLD AUTO: 41.2 % (ref 34–46.6)
HGB BLD-MCNC: 13.8 G/DL (ref 12–15.9)
IMM GRANULOCYTES # BLD AUTO: 0.03 10*3/MM3 (ref 0–0.05)
IMM GRANULOCYTES NFR BLD AUTO: 0.3 % (ref 0–0.5)
LV EF NUC BP: 82 %
LYMPHOCYTES # BLD AUTO: 3.47 10*3/MM3 (ref 0.7–3.1)
LYMPHOCYTES NFR BLD AUTO: 38.9 % (ref 19.6–45.3)
MAGNESIUM SERPL-MCNC: 2.2 MG/DL (ref 1.6–2.6)
MAXIMAL PREDICTED HEART RATE: 174 BPM
MCH RBC QN AUTO: 32.9 PG (ref 26.6–33)
MCHC RBC AUTO-ENTMCNC: 33.5 G/DL (ref 31.5–35.7)
MCV RBC AUTO: 98.3 FL (ref 79–97)
MONOCYTES # BLD AUTO: 0.87 10*3/MM3 (ref 0.1–0.9)
MONOCYTES NFR BLD AUTO: 9.8 % (ref 5–12)
NEUTROPHILS NFR BLD AUTO: 4.08 10*3/MM3 (ref 1.7–7)
NEUTROPHILS NFR BLD AUTO: 45.7 % (ref 42.7–76)
NRBC BLD AUTO-RTO: 0 /100 WBC (ref 0–0.2)
PERCENT MAX PREDICTED HR: 88.51 %
PLATELET # BLD AUTO: 262 10*3/MM3 (ref 140–450)
PMV BLD AUTO: 10.6 FL (ref 6–12)
POTASSIUM SERPL-SCNC: 3.9 MMOL/L (ref 3.5–5.2)
QT INTERVAL: 346 MS
QTC INTERVAL: 441 MS
RBC # BLD AUTO: 4.19 10*6/MM3 (ref 3.77–5.28)
SINUS: 2.3 CM
SODIUM SERPL-SCNC: 140 MMOL/L (ref 136–145)
STRESS BASELINE BP: NORMAL MMHG
STRESS BASELINE HR: 84 BPM
STRESS PERCENT HR: 104 %
STRESS POST ESTIMATED WORKLOAD: 10.7 METS
STRESS POST EXERCISE DUR MIN: 10 MIN
STRESS POST EXERCISE DUR SEC: 10 SEC
STRESS POST PEAK BP: NORMAL MMHG
STRESS POST PEAK HR: 154 BPM
STRESS TARGET HR: 148 BPM
WBC NRBC COR # BLD AUTO: 8.92 10*3/MM3 (ref 3.4–10.8)

## 2024-05-23 PROCEDURE — 93306 TTE W/DOPPLER COMPLETE: CPT | Performed by: INTERNAL MEDICINE

## 2024-05-23 PROCEDURE — G0378 HOSPITAL OBSERVATION PER HR: HCPCS

## 2024-05-23 PROCEDURE — 93017 CV STRESS TEST TRACING ONLY: CPT

## 2024-05-23 PROCEDURE — 78452 HT MUSCLE IMAGE SPECT MULT: CPT

## 2024-05-23 PROCEDURE — 83735 ASSAY OF MAGNESIUM: CPT | Performed by: PHYSICIAN ASSISTANT

## 2024-05-23 PROCEDURE — 93356 MYOCRD STRAIN IMG SPCKL TRCK: CPT | Performed by: INTERNAL MEDICINE

## 2024-05-23 PROCEDURE — 80048 BASIC METABOLIC PNL TOTAL CA: CPT | Performed by: PHYSICIAN ASSISTANT

## 2024-05-23 PROCEDURE — 85025 COMPLETE CBC W/AUTO DIFF WBC: CPT | Performed by: PHYSICIAN ASSISTANT

## 2024-05-23 PROCEDURE — 93018 CV STRESS TEST I&R ONLY: CPT | Performed by: INTERNAL MEDICINE

## 2024-05-23 PROCEDURE — 93356 MYOCRD STRAIN IMG SPCKL TRCK: CPT

## 2024-05-23 PROCEDURE — 78452 HT MUSCLE IMAGE SPECT MULT: CPT | Performed by: INTERNAL MEDICINE

## 2024-05-23 PROCEDURE — 0 TECHNETIUM TETROFOSMIN KIT: Performed by: EMERGENCY MEDICINE

## 2024-05-23 PROCEDURE — 93016 CV STRESS TEST SUPVJ ONLY: CPT | Performed by: INTERNAL MEDICINE

## 2024-05-23 PROCEDURE — A9502 TC99M TETROFOSMIN: HCPCS | Performed by: EMERGENCY MEDICINE

## 2024-05-23 PROCEDURE — 93306 TTE W/DOPPLER COMPLETE: CPT

## 2024-05-23 RX ORDER — PANTOPRAZOLE SODIUM 40 MG/1
40 TABLET, DELAYED RELEASE ORAL DAILY
Qty: 30 TABLET | Refills: 0 | Status: SHIPPED | OUTPATIENT
Start: 2024-05-23 | End: 2024-06-22

## 2024-05-23 RX ADMIN — TETROFOSMIN 1 DOSE: 1.38 INJECTION, POWDER, LYOPHILIZED, FOR SOLUTION INTRAVENOUS at 08:40

## 2024-05-23 RX ADMIN — ASPIRIN 81 MG CHEWABLE TABLET 81 MG: 81 TABLET CHEWABLE at 10:23

## 2024-05-23 RX ADMIN — Medication 10 ML: at 10:23

## 2024-05-23 RX ADMIN — Medication 10 ML: at 07:30

## 2024-05-23 RX ADMIN — CETIRIZINE HYDROCHLORIDE 10 MG: 10 TABLET, FILM COATED ORAL at 10:23

## 2024-05-23 RX ADMIN — TETROFOSMIN 1 DOSE: 1.38 INJECTION, POWDER, LYOPHILIZED, FOR SOLUTION INTRAVENOUS at 06:57

## 2024-05-23 NOTE — PLAN OF CARE
Goal Outcome Evaluation:   Aaox4, RA, tele monitored. Awaiting results of echo and stress test. Diet advanced.

## 2024-05-23 NOTE — CASE MANAGEMENT/SOCIAL WORK
Discharge Planning Assessment   Mike     Patient Name: Beth Menchaca  MRN: [id number]  Today's Date: 5/23/2024    Admit Date: 5/22/2024    Plan: Routine home   Discharge Needs Assessment       Row Name 05/23/24 1128       Living Environment    People in Home spouse    Name(s) of People in Home polina Mcneal    Current Living Arrangements home    Potentially Unsafe Housing Conditions none    In the past 12 months has the electric, gas, oil, or water company threatened to shut off services in your home? No    Primary Care Provided by self    Provides Primary Care For no one    Family Caregiver if Needed spouse    Family Caregiver Names  Fredis    Quality of Family Relationships helpful;involved;supportive    Able to Return to Prior Arrangements yes       Resource/Environmental Concerns    Resource/Environmental Concerns none    Transportation Concerns none       Transportation Needs    In the past 12 months, has lack of transportation kept you from medical appointments or from getting medications? no    In the past 12 months, has lack of transportation kept you from meetings, work, or from getting things needed for daily living? No       Food Insecurity    Within the past 12 months, you worried that your food would run out before you got the money to buy more. Never true    Within the past 12 months, the food you bought just didn't last and you didn't have money to get more. Never true       Transition Planning    Patient/Family Anticipates Transition to home with family    Patient/Family Anticipated Services at Transition none    Transportation Anticipated car, drives self;family or friend will provide       Discharge Needs Assessment    Readmission Within the Last 30 Days no previous admission in last 30 days    Equipment Currently Used at Home none    Concerns to be Addressed denies needs/concerns at this time    Anticipated Changes Related to Illness none    Equipment Needed After Discharge none                    Discharge Plan       Row Name 05/23/24 1129       Plan    Plan Routine home    Plan Comments CM met with patient at the bedside. Confirmed PCP, insurance, and pharmacy. Patient denies any difficulty affording medications. Patient is not current with any HHC/OPPT/OT services. Patient lives at home with , is IADLS, and drives. Patient will drive herself home at DC. DC Barriers: Pending myoview results.                  Continued Care and Services - Admitted Since 5/22/2024    No active coordination exists for this encounter.       Expected Discharge Date and Time       Expected Discharge Date Expected Discharge Time    May 23, 2024            Demographic Summary       Row Name 05/23/24 1127       General Information    Admission Type observation    Arrived From emergency department    Referral Source admission list    Reason for Consult discharge planning    Preferred Language English       Contact Information    Permission Granted to Share Info With     Contact Information Obtained for                    Functional Status       Row Name 05/23/24 1127       Functional Status    Usual Activity Tolerance good    Current Activity Tolerance good       Functional Status, IADL    Medications independent    Meal Preparation independent    Housekeeping independent    Laundry independent    Shopping independent       Mental Status    General Appearance WDL WDL       Mental Status Summary    Recent Changes in Mental Status/Cognitive Functioning no changes           Sammy Martin RN     Cell number [phone]  Office number [phone]

## 2024-05-23 NOTE — PLAN OF CARE
Goal Outcome Evaluation:        Problem: Adult Inpatient Plan of Care  Goal: Plan of Care Review  Outcome: Ongoing, Progressing  Goal: Patient-Specific Goal (Individualized)  Outcome: Ongoing, Progressing  Goal: Absence of Hospital-Acquired Illness or Injury  Outcome: Ongoing, Progressing  Intervention: Identify and Manage Fall Risk  Recent Flowsheet Documentation  Taken 5/23/2024 0230 by Silvina Mir RN  Safety Promotion/Fall Prevention:   nonskid shoes/slippers when out of bed   safety round/check completed   assistive device/personal items within reach   clutter free environment maintained   lighting adjusted   room organization consistent   toileting scheduled  Taken 5/23/2024 0045 by Silvina Mir RN  Safety Promotion/Fall Prevention:   nonskid shoes/slippers when out of bed   safety round/check completed   assistive device/personal items within reach   clutter free environment maintained   lighting adjusted   room organization consistent   toileting scheduled  Taken 5/22/2024 2200 by Silvina Mir RN  Safety Promotion/Fall Prevention:   nonskid shoes/slippers when out of bed   safety round/check completed   assistive device/personal items within reach   clutter free environment maintained   lighting adjusted   room organization consistent   toileting scheduled  Taken 5/22/2024 2050 by Silvina Mir RN  Safety Promotion/Fall Prevention:   nonskid shoes/slippers when out of bed   safety round/check completed   assistive device/personal items within reach   clutter free environment maintained   lighting adjusted   room organization consistent   toileting scheduled  Goal: Optimal Comfort and Wellbeing  Outcome: Ongoing, Progressing  Goal: Readiness for Transition of Care  Outcome: Ongoing, Progressing     Problem: Hypertension Comorbidity  Goal: Blood Pressure in Desired Range  Outcome: Ongoing, Progressing

## 2024-05-23 NOTE — DISCHARGE SUMMARY
[facility]    Stella Carr PA    CHIEF COMPLAINT:     Chest pain    HISTORY OF PRESENT ILLNESS:    History of Present Illness  24:  Patient reports having severe chest discomfort described as a heaviness on the left side of her chest on the previous evening while laying down for bed.  She took 481 mg aspirin at that time.  Some dyspnea as well as palpitations were present though she denies any recent significant cough (mild cough associated with allergies as noted), fever or changes in bowel or bladder habits.  Additionally she notes that for some time now she has had some lightheadedness/seeing spots whenever she changes position quickly but has not had any syncopal episodes.  No peripheral edema is noted.  Family history is significant for father who passed away of an MI and is 50s as well as brother and sister who both have CAD.     2024:  Patient reports she did generally well overnight, was able to rest adequately and experienced no new or other acute symptoms.                 Past Medical History:   Diagnosis Date    Arthritis     Elevated cholesterol     HA (headache)     Hypertension      Past Surgical History:   Procedure Laterality Date    HYSTERECTOMY      TUBAL ABDOMINAL LIGATION       Family History   Problem Relation Age of Onset    Diabetes Mother     Diabetes Father     Diabetes Sister     Diabetes Brother     Prostate cancer Maternal Grandfather     Lung cancer Maternal Grandfather     Rheumatologic disease Maternal Grandmother      Social History     Tobacco Use    Smoking status: Former     Current packs/day: 0.00     Types: Cigarettes     Quit date: 3/31/2006     Years since quittin.1     Passive exposure: Never    Smokeless tobacco: Never   Vaping Use    Vaping status: Never Used   Substance Use Topics    Alcohol use: Yes     Comment: rarely    Drug use: Never     Medications Prior to Admission   Medication Sig Dispense Refill Last Dose    cetirizine  (zyrTEC) 10 MG tablet Take 1 tablet by mouth Daily.   5/21/2024    ibuprofen (ADVIL,MOTRIN) 200 MG tablet Take 200 mg by mouth every 6 (six) hours as needed for moderate pain (4-6).        Allergies:  Adhesive tape and Penicillins    Immunization History   Administered Date(s) Administered    Influenza TIV (IM) 01/02/2016    Tdap 02/06/2021           REVIEW OF SYSTEMS:    Review of Systems   Constitutional: Negative.   HENT: Negative.     Eyes: Negative.    Cardiovascular:  Positive for chest pain, near-syncope and palpitations.   Respiratory:  Positive for cough and shortness of breath.    Skin: Negative.    Musculoskeletal: Negative.    Gastrointestinal: Negative.    Genitourinary: Negative.    Neurological: Negative.    Psychiatric/Behavioral: Negative.       Vital Signs  Temp:  [97.5 °F (36.4 °C)-99.1 °F (37.3 °C)] 99.1 °F (37.3 °C)  Heart Rate:  [70-82] 75  Resp:  [15-18] 15  BP: (112-146)/(71-90) 122/71          Physical Exam:  Physical Exam  Constitutional:       General: She is not in acute distress.     Appearance: Normal appearance. She is obese. She is not ill-appearing, toxic-appearing or diaphoretic.   HENT:      Head: Normocephalic.      Right Ear: External ear normal.      Left Ear: External ear normal.      Nose: Nose normal.      Mouth/Throat:      Mouth: Mucous membranes are moist.   Eyes:      Extraocular Movements: Extraocular movements intact.   Cardiovascular:      Rate and Rhythm: Normal rate and regular rhythm.      Pulses: Normal pulses.   Pulmonary:      Effort: Pulmonary effort is normal.      Breath sounds: Normal breath sounds.   Abdominal:      General: Bowel sounds are normal.      Palpations: Abdomen is soft.   Musculoskeletal:      Cervical back: Normal range of motion.      Right lower leg: No edema.      Left lower leg: No edema.   Skin:     General: Skin is warm and dry.      Capillary Refill: Capillary refill takes less than 2 seconds.   Neurological:      General: No focal  deficit present.      Mental Status: She is alert.   Psychiatric:         Mood and Affect: Mood normal.         Behavior: Behavior normal.         Thought Content: Thought content normal.         Judgment: Judgment normal.           Emotional Behavior:   Normal   Debilities:  None  Results Review:    I reviewed the patient's new clinical results.  Lab Results (most recent)       Procedure Component Value Units Date/Time    Basic Metabolic Panel [[id number]]  (Abnormal) Collected: 05/23/24 0530    Specimen: Blood Updated: 05/23/24 0632     Glucose 102 mg/dL      BUN 17 mg/dL      Creatinine 0.62 mg/dL      Sodium 140 mmol/L      Potassium 3.9 mmol/L      Chloride 109 mmol/L      CO2 20.0 mmol/L      Calcium 9.5 mg/dL      BUN/Creatinine Ratio 27.4     Anion Gap 11.0 mmol/L      eGFR 111.4 mL/min/1.73     Narrative:      GFR Normal >60  Chronic Kidney Disease <60  Kidney Failure <15      Magnesium [[id number]]  (Normal) Collected: 05/23/24 0530    Specimen: Blood Updated: 05/23/24 0632     Magnesium 2.2 mg/dL     CBC & Differential [[id number]]  (Abnormal) Collected: 05/23/24 0530    Specimen: Blood Updated: 05/23/24 0611    Narrative:      The following orders were created for panel order CBC & Differential.  Procedure                               Abnormality         Status                     ---------                               -----------         ------                     CBC Auto Differential[[id number]]        Abnormal            Final result                 Please view results for these tests on the individual orders.    CBC Auto Differential [[id number]]  (Abnormal) Collected: 05/23/24 0530    Specimen: Blood Updated: 05/23/24 0611     WBC 8.92 10*3/mm3      RBC 4.19 10*6/mm3      Hemoglobin 13.8 g/dL      Hematocrit 41.2 %      MCV 98.3 fL      MCH 32.9 pg      MCHC 33.5 g/dL      RDW 12.9 %      RDW-SD 46.6 fl      MPV 10.6 fL      Platelets 262 10*3/mm3      Neutrophil % 45.7 %      Lymphocyte % 38.9 %       Monocyte % 9.8 %      Eosinophil % 4.6 %      Basophil % 0.7 %      Immature Grans % 0.3 %      Neutrophils, Absolute 4.08 10*3/mm3      Lymphocytes, Absolute 3.47 10*3/mm3      Monocytes, Absolute 0.87 10*3/mm3      Eosinophils, Absolute 0.41 10*3/mm3      Basophils, Absolute 0.06 10*3/mm3      Immature Grans, Absolute 0.03 10*3/mm3      nRBC 0.0 /100 WBC     Lipid Panel [[id number]]  (Abnormal) Collected: 05/22/24 1107    Specimen: Blood Updated: 05/22/24 1355     Total Cholesterol 192 mg/dL      Triglycerides 79 mg/dL      HDL Cholesterol 56 mg/dL      LDL Cholesterol  122 mg/dL      VLDL Cholesterol 14 mg/dL      LDL/HDL Ratio 2.15    Narrative:      Cholesterol Reference Ranges  (U.S. Department of Health and Human Services ATP III Classifications)    Desirable          <200 mg/dL  Borderline High    200-239 mg/dL  High Risk          >240 mg/dL      Triglyceride Reference Ranges  (U.S. Department of Health and Human Services ATP III Classifications)    Normal           <150 mg/dL  Borderline High  150-199 mg/dL  High             200-499 mg/dL  Very High        >500 mg/dL    HDL Reference Ranges  (U.S. Department of Health and Human Services ATP III Classifications)    Low     <40 mg/dl (major risk factor for CHD)  High    >60 mg/dl ('negative' risk factor for CHD)        LDL Reference Ranges  (U.S. Department of Health and Human Services ATP III Classifications)    Optimal          <100 mg/dL  Near Optimal     100-129 mg/dL  Borderline High  130-159 mg/dL  High             160-189 mg/dL  Very High        >189 mg/dL    T4, Free [[id number]]  (Normal) Collected: 05/22/24 1107    Specimen: Blood Updated: 05/22/24 1217     Free T4 1.46 ng/dL     Narrative:      Results may be falsely increased if patient taking Biotin.      Single High Sensitivity Troponin T [[id number]]  (Normal) Collected: 05/22/24 1107    Specimen: Blood Updated: 05/22/24 1149     HS Troponin T <6 ng/L     Narrative:      High Sensitive Troponin T  Reference Range:  <14.0 ng/L- Negative Female for AMI  <22.0 ng/L- Negative Male for AMI  >=14 - Abnormal Female indicating possible myocardial injury.  >=22 - Abnormal Male indicating possible myocardial injury.   Clinicians would have to utilize clinical acumen, EKG, Troponin, and serial changes to determine if it is an Acute Myocardial Infarction or myocardial injury due to an underlying chronic condition.         TSH [[id number]]  (Normal) Collected: 05/22/24 1107    Specimen: Blood Updated: 05/22/24 1149     TSH 0.999 uIU/mL     Comprehensive Metabolic Panel [[id number]]  (Abnormal) Collected: 05/22/24 1107    Specimen: Blood Updated: 05/22/24 1142     Glucose 117 mg/dL      BUN 13 mg/dL      Creatinine 0.67 mg/dL      Sodium 138 mmol/L      Potassium 4.0 mmol/L      Comment: Slight hemolysis detected by analyzer. Result may be falsely elevated.        Chloride 105 mmol/L      CO2 22.0 mmol/L      Calcium 10.1 mg/dL      Total Protein 7.2 g/dL      Albumin 4.3 g/dL      ALT (SGPT) 14 U/L      AST (SGOT) 14 U/L      Alkaline Phosphatase 72 U/L      Total Bilirubin 0.4 mg/dL      Globulin 2.9 gm/dL      A/G Ratio 1.5 g/dL      BUN/Creatinine Ratio 19.4     Anion Gap 11.0 mmol/L      eGFR 109.3 mL/min/1.73     Narrative:      GFR Normal >60  Chronic Kidney Disease <60  Kidney Failure <15      Magnesium [[id number]]  (Normal) Collected: 05/22/24 1107    Specimen: Blood Updated: 05/22/24 1142     Magnesium 2.4 mg/dL     D-dimer, Quantitative [[id number]]  (Normal) Collected: 05/22/24 1107    Specimen: Blood Updated: 05/22/24 1125     D-Dimer, Quantitative 0.27 mg/L (FEU)     Narrative:      According to the assay 's published package insert, a normal (<0.50 mg/L (FEU)) D-dimer result in conjunction with a non-high clinical probability assessment, excludes deep vein thrombosis (DVT) and pulmonary embolism (PE) with high sensitivity.    D-dimer values increase with age and this can make VTE exclusion of an  "older population difficult. To address this, the [employer], based on best available evidence and recent guidelines, recommends that clinicians use age-adjusted D-dimer thresholds in patients greater than 50 years of age with: a) a low probability of PE who do not meet all Pulmonary Embolism Rule Out Criteria, or b) in those with intermediate probability of PE.   The formula for an age-adjusted D-dimer cut-off is \"age/100\".  For example, a 60 year old patient would have an age-adjusted cut-off of 0.60 mg/L (FEU) and an 80 year old 0.80 mg/L (FEU).    CBC & Differential [[id number]]  (Abnormal) Collected: 05/22/24 1107    Specimen: Blood Updated: 05/22/24 1115    Narrative:      The following orders were created for panel order CBC & Differential.  Procedure                               Abnormality         Status                     ---------                               -----------         ------                     CBC Auto Differential[[id number]]        Abnormal            Final result                 Please view results for these tests on the individual orders.    CBC Auto Differential [[id number]]  (Abnormal) Collected: 05/22/24 1107    Specimen: Blood Updated: 05/22/24 1115     WBC 10.33 10*3/mm3      RBC 4.27 10*6/mm3      Hemoglobin 14.2 g/dL      Hematocrit 41.7 %      MCV 97.7 fL      MCH 33.3 pg      MCHC 34.1 g/dL      RDW 12.9 %      RDW-SD 46.3 fl      MPV 10.5 fL      Platelets 262 10*3/mm3      Neutrophil % 66.1 %      Lymphocyte % 24.8 %      Monocyte % 6.1 %      Eosinophil % 2.2 %      Basophil % 0.6 %      Immature Grans % 0.2 %      Neutrophils, Absolute 6.83 10*3/mm3      Lymphocytes, Absolute 2.56 10*3/mm3      Monocytes, Absolute 0.63 10*3/mm3      Eosinophils, Absolute 0.23 10*3/mm3      Basophils, Absolute 0.06 10*3/mm3      Immature Grans, Absolute 0.02 10*3/mm3      nRBC 0.0 /100 WBC             Imaging Results (Most Recent)       Procedure Component Value Units " Date/Time    XR Chest 1 View [[id number]] Collected: 05/22/24 1046     Updated: 05/22/24 1049    Narrative:      XR CHEST 1 VW    Date of Exam: 5/22/2024 10:30 AM EDT    Indication: Chest pain short of breath    Comparison: None available.    Findings:  No acute airspace disease. Heart size is normal. No pleural effusion, pneumothorax or acute osseous abnormality. Mild upper lumbar curvature toward the right.      Impression:      Impression:  No acute chest finding.      Electronically Signed: Mica You MD    5/22/2024 10:47 AM EDT    Workstation ID: [id number]          reviewed    ECG/EMG Results (most recent)       Procedure Component Value Units Date/Time    ECG 12 Lead Chest Pain [[id number]] Collected: 05/22/24 1005     Updated: 05/23/24 0839     QT Interval 346 ms      QTC Interval 441 ms     Narrative:      HEART RATE= 97  bpm  RR Interval= 616  ms  CT Interval= 140  ms  P Horizontal Axis= 15  deg  P Front Axis= 40  deg  QRSD Interval= 82  ms  QT Interval= 346  ms  QTcB= 441  ms  QRS Axis= 72  deg  T Wave Axis= 10  deg  - OTHERWISE NORMAL ECG -  Sinus rhythm  Low voltage, precordial leads  When compared with ECG of 19-Dec-2022 15:51:47,  Significant rate increase  Electronically Signed By: Shashi Rangel ([facility]) 23-May-2024 08:38:50  Date and Time of Study: 2024-05-22 10:05:19    Adult Transthoracic Echo Complete W/ Cont if Necessary Per Protocol [[id number]] Resulted: 05/23/24 0936     Updated: 05/23/24 0937     EF_3D-VOL 64.0 %      LV GLOBAL STRAIN  -17.2 %      LVIDd 4.4 cm      LVIDs 3.2 cm      IVSd 1.00 cm      LVPWd 1.00 cm      FS 27.3 %      IVS/LVPW 1.00 cm      ESV(cubed) 32.8 ml      EDV(cubed) 85.2 ml      LV mass(C)d 147.8 grams      LVOT area 2.01 cm2      LVOT diam 1.60 cm      EDV(MOD-sp4) 65.8 ml      ESV(MOD-sp4) 19.5 ml      SV(MOD-sp4) 46.3 ml      EF(MOD-sp4) 70.4 %      MV E max chapin 88.0 cm/sec      MV A max chapin 81.2 cm/sec      MV dec time 0.21 sec      MV E/A 1.08     Pulm A Revs Dur  0.11 sec      Med Peak E' Pierce 12.6 cm/sec      Lat Peak E' Pierce 16.0 cm/sec      Avg E/e' ratio 6.15     SV(LVOT) 39.6 ml      RVIDd 2.30 cm      TAPSE (>1.6) 2.7 cm      LA dimension (2D)  3.2 cm      Pulm Sys Pierce 52.1 cm/sec      Pulm Andrade Pierce 46.4 cm/sec      Pulm S/D 1.12     Pulm A Revs Pierce 35.7 cm/sec      LV V1 max 112.0 cm/sec      LV V1 max PG 5.0 mmHg      LV V1 mean PG 2.00 mmHg      LV V1 VTI 19.7 cm      Ao pk pierce 120.0 cm/sec      Ao max PG 5.8 mmHg      Ao mean PG 3.0 mmHg      Ao V2 VTI 24.7 cm      AYAAN(I,D) 1.60 cm2      MV max PG 3.7 mmHg      MV mean PG 2.00 mmHg      MV V2 VTI 24.0 cm      MV P1/2t 78.4 msec      MVA(P1/2t) 2.8 cm2      MVA(VTI) 1.65 cm2      MV dec slope 388.5 cm/sec2      RV V1 max PG 2.7 mmHg      RV V1 max 81.4 cm/sec      RV V1 VTI 17.5 cm      PA V2 max 112.0 cm/sec      ACS 1.90 cm      Sinus 2.30 cm           reviewed            Microbiology Results (last 10 days)       ** No results found for the last 240 hours. **            Assessment & Plan     Chest pain       Chest pain  Lab Results   Component Value Date    TROPONINT <6 05/22/2024   -D-dimer: 0.27  -TSH: 0.999, free T4: 1.46  -Lipid panel showed total cholesterol of 192 with an LDL of 122 and an HDL of 56  -Chest X-ray: No acute finding  -EKG: Sinus rhythm at 97 with T wave inversion in lead III but without obvious acute changes or ectopy with a QTc of 441 ms  -In the ED pt given 325 mg aspirin  -Stress Test showed normal myocardial perfusion imaging without evidence of ischemia consistent with a low risk study with an EF calculated at greater than 70% low risk for ischemic heart disease  -Echocardiogram   -Telemetry  -NPO at midnight      I discussed the patients findings and my recommendations with patient and nursing staff.     Discharge Diagnosis:      Chest pain      Hospital Course  Patient is a 48 y.o. female presented with chest pain with an HPI noted above.  Troponin was found to be less than 6 with a  D-dimer of 0.27.  TSH and free T4 within normal limits at 0.999 and 1.46 respectively.  Lipid panel showed a total cholesterol of 192 with an LDL of 122 and an HDL of 56.  Chest x-ray showed no acute process.  EKG showed sinus rhythm at 97 without obvious acute changes though T wave inversion was noted in lead III.  She was continued on telemetry without significant events reported.  Patient was given 325 mg aspirin in the ED.  Stress testing was performed on 2024 and was found to be low risk for ischemic heart disease.  Echocardiogram was also obtained.  At this time patient is felt to be in good condition for discharge with close follow-up with her PCP as well as cardiology on an outpatient basis.  Her full testing/results and plan were discussed with patient along with concerning/alarm symptoms for which to call 911/return to the ED. a trial of pantoprazole will also be prescribed at discharge.  All questions were answered and she verbalizes her understanding and agreement.    Past Medical History:     Past Medical History:   Diagnosis Date    Arthritis     Elevated cholesterol     HA (headache)     Hypertension        Past Surgical History:     Past Surgical History:   Procedure Laterality Date    HYSTERECTOMY      TUBAL ABDOMINAL LIGATION         Social History:   Social History     Socioeconomic History    Marital status:    Tobacco Use    Smoking status: Former     Current packs/day: 0.00     Types: Cigarettes     Quit date: 3/31/2006     Years since quittin.1     Passive exposure: Never    Smokeless tobacco: Never   Vaping Use    Vaping status: Never Used   Substance and Sexual Activity    Alcohol use: Yes     Comment: rarely    Drug use: Never    Sexual activity: Yes     Partners: Male       Procedures Performed         Consults:   Consults       No orders found for last 30 day(s).            Condition on Discharge:     Stable    Discharge Disposition  Home or Self Care    Discharge  Medications     Discharge Medications        New Medications        Instructions Start Date   pantoprazole 40 MG EC tablet  Commonly known as: PROTONIX   40 mg, Oral, Daily             Continue These Medications        Instructions Start Date   cetirizine 10 MG tablet  Commonly known as: zyrTEC   10 mg, Oral, Daily             Stop These Medications      ibuprofen 200 MG tablet  Commonly known as: ADVIL,MOTRIN              Discharge Diet:     Activity at Discharge:     Follow-up Appointments  No future appointments.  Additional Instructions for the Follow-ups that You Need to Schedule       Discharge Follow-up with PCP   As directed       Currently Documented PCP:    Stella Carr PA    PCP Phone Number:    [phone]     Follow Up Details: 5 to 7 days                Test Results Pending at Discharge       Risk for Readmission (LACE) Score: 1 (5/23/2024  6:00 AM)      Less Than 30 minutes spent in discharge activities for this patient    Signature:Electronically signed by Kraig Kamara PA-C, 05/23/24, 2:39 PM EDT.

## 2024-08-22 ENCOUNTER — TELEPHONE (OUTPATIENT)
Dept: URGENT CARE | Facility: CLINIC | Age: 49
End: 2024-08-22

## 2024-08-22 PROCEDURE — 87636 SARSCOV2 & INF A&B AMP PRB: CPT | Performed by: FAMILY MEDICINE

## 2024-08-23 ENCOUNTER — TELEPHONE (OUTPATIENT)
Dept: URGENT CARE | Facility: CLINIC | Age: 49
End: 2024-08-23
Payer: COMMERCIAL

## 2024-08-24 ENCOUNTER — TELEPHONE (OUTPATIENT)
Dept: URGENT CARE | Facility: CLINIC | Age: 49
End: 2024-08-24
Payer: COMMERCIAL

## 2024-08-24 DIAGNOSIS — B96.89 ACUTE BACTERIAL SINUSITIS: Primary | ICD-10-CM

## 2024-08-24 DIAGNOSIS — J01.90 ACUTE BACTERIAL SINUSITIS: Primary | ICD-10-CM

## 2024-08-24 RX ORDER — DOXYCYCLINE HYCLATE 100 MG/1
100 CAPSULE ORAL 2 TIMES DAILY
Qty: 14 CAPSULE | Refills: 0 | Status: SHIPPED | OUTPATIENT
Start: 2024-08-24 | End: 2024-08-31

## 2024-08-25 ENCOUNTER — TELEPHONE (OUTPATIENT)
Dept: URGENT CARE | Facility: CLINIC | Age: 49
End: 2024-08-25
Payer: COMMERCIAL

## 2024-08-26 NOTE — TELEPHONE ENCOUNTER
I tried to call patient to let her know of the Rx and see if she picked it up and/or had any questions. No answer and no voicemail.

## 2024-10-16 PROCEDURE — 87086 URINE CULTURE/COLONY COUNT: CPT | Performed by: PHYSICIAN ASSISTANT
